# Patient Record
Sex: MALE | Race: WHITE | NOT HISPANIC OR LATINO | URBAN - METROPOLITAN AREA
[De-identification: names, ages, dates, MRNs, and addresses within clinical notes are randomized per-mention and may not be internally consistent; named-entity substitution may affect disease eponyms.]

---

## 2024-10-02 ENCOUNTER — APPOINTMENT (OUTPATIENT)
Dept: PRIMARY CARE | Facility: CLINIC | Age: 24
End: 2024-10-02
Payer: OTHER GOVERNMENT

## 2024-10-02 VITALS
TEMPERATURE: 98.3 F | OXYGEN SATURATION: 94 % | BODY MASS INDEX: 35.4 KG/M2 | SYSTOLIC BLOOD PRESSURE: 113 MMHG | HEART RATE: 76 BPM | HEIGHT: 69 IN | DIASTOLIC BLOOD PRESSURE: 68 MMHG | WEIGHT: 239 LBS

## 2024-10-02 DIAGNOSIS — L82.1 SEBORRHEIC KERATOSIS: Primary | ICD-10-CM

## 2024-10-02 DIAGNOSIS — F41.9 ANXIETY: ICD-10-CM

## 2024-10-02 DIAGNOSIS — G47.9 SLEEP DISORDER: ICD-10-CM

## 2024-10-02 PROCEDURE — 3008F BODY MASS INDEX DOCD: CPT | Performed by: STUDENT IN AN ORGANIZED HEALTH CARE EDUCATION/TRAINING PROGRAM

## 2024-10-02 PROCEDURE — 99204 OFFICE O/P NEW MOD 45 MIN: CPT | Performed by: STUDENT IN AN ORGANIZED HEALTH CARE EDUCATION/TRAINING PROGRAM

## 2024-10-02 RX ORDER — SERTRALINE HYDROCHLORIDE 100 MG/1
100 TABLET, FILM COATED ORAL
Qty: 30 TABLET | Refills: 11 | Status: SHIPPED | OUTPATIENT
Start: 2024-10-02 | End: 2025-09-27

## 2024-10-02 RX ORDER — KETOCONAZOLE 20 MG/ML
SHAMPOO, SUSPENSION TOPICAL 2 TIMES WEEKLY
Qty: 120 ML | Refills: 0 | Status: SHIPPED | OUTPATIENT
Start: 2024-10-03

## 2024-10-02 RX ORDER — SERTRALINE HYDROCHLORIDE 50 MG/1
1 TABLET, FILM COATED ORAL
COMMUNITY
Start: 2024-08-26 | End: 2024-10-02 | Stop reason: SDUPTHER

## 2024-10-02 ASSESSMENT — COLUMBIA-SUICIDE SEVERITY RATING SCALE - C-SSRS
1. IN THE PAST MONTH, HAVE YOU WISHED YOU WERE DEAD OR WISHED YOU COULD GO TO SLEEP AND NOT WAKE UP?: NO
2. HAVE YOU ACTUALLY HAD ANY THOUGHTS OF KILLING YOURSELF?: NO
6. HAVE YOU EVER DONE ANYTHING, STARTED TO DO ANYTHING, OR PREPARED TO DO ANYTHING TO END YOUR LIFE?: NO

## 2024-10-02 ASSESSMENT — PATIENT HEALTH QUESTIONNAIRE - PHQ9
5. POOR APPETITE OR OVEREATING: NEARLY EVERY DAY
7. TROUBLE CONCENTRATING ON THINGS, SUCH AS READING THE NEWSPAPER OR WATCHING TELEVISION: NEARLY EVERY DAY
9. THOUGHTS THAT YOU WOULD BE BETTER OFF DEAD, OR OF HURTING YOURSELF: NEARLY EVERY DAY
SUM OF ALL RESPONSES TO PHQ9 QUESTIONS 1 AND 2: 4
8. MOVING OR SPEAKING SO SLOWLY THAT OTHER PEOPLE COULD HAVE NOTICED. OR THE OPPOSITE, BEING SO FIGETY OR RESTLESS THAT YOU HAVE BEEN MOVING AROUND A LOT MORE THAN USUAL: NEARLY EVERY DAY
2. FEELING DOWN, DEPRESSED OR HOPELESS: MORE THAN HALF THE DAYS
SUM OF ALL RESPONSES TO PHQ QUESTIONS 1-9: 25
1. LITTLE INTEREST OR PLEASURE IN DOING THINGS: MORE THAN HALF THE DAYS
10. IF YOU CHECKED OFF ANY PROBLEMS, HOW DIFFICULT HAVE THESE PROBLEMS MADE IT FOR YOU TO DO YOUR WORK, TAKE CARE OF THINGS AT HOME, OR GET ALONG WITH OTHER PEOPLE: SOMEWHAT DIFFICULT
3. TROUBLE FALLING OR STAYING ASLEEP OR SLEEPING TOO MUCH: NEARLY EVERY DAY
4. FEELING TIRED OR HAVING LITTLE ENERGY: NEARLY EVERY DAY
6. FEELING BAD ABOUT YOURSELF - OR THAT YOU ARE A FAILURE OR HAVE LET YOURSELF OR YOUR FAMILY DOWN: NEARLY EVERY DAY

## 2024-10-02 ASSESSMENT — ENCOUNTER SYMPTOMS
LOSS OF SENSATION IN FEET: 0
NERVOUS/ANXIOUS: 1
DEPRESSION: 1
OCCASIONAL FEELINGS OF UNSTEADINESS: 0
DIZZINESS: 1

## 2024-10-02 ASSESSMENT — PAIN SCALES - GENERAL: PAINLEVEL: 0-NO PAIN

## 2024-10-02 NOTE — PROGRESS NOTES
"  Medical record number: [id number]    Subjective   Patient ID: Roddy Ortiz is a 24 y.o. male who presents for Establish Care (Npv looking for a doctor. /Pt wants a sleep test /Pt gets razor bumps/Ed and Pe ). Moved from [place] for     1. Sleep concerns  2 years of signficant issues  Things seem to have been escalating  Since age 18 having anxiety issues, social cues  Insomnia  Tries to nap, but works for [employer], so that is not always possible  Does not ever make up the deficit  No reported amy  Going to counseling online biweekly  Recent move from [place]  Regular exercise, athletic build  One previous potential concussion, age 16, football    A/  STOPBANG: S- T+ O- P- B+ for BMI 35, but rather muscular and regularly exercises, A- N incomplete, did not measure neck circumference, G+ male  STOPBANG score 3 (intermediate risk, but BMI questionably positive)  Denies SI, HI  Does not appear to be responding to internal stimuli  Denies amy  Endorses a few instances of hypnopompic hallucination of \"shadow person\" during episodes of sleep paralysis  BMI 35, athletic build  /68  HR 76 bpm  T 36.8 F    P/  [ ] referral to Sleep Medicine for mixture of symptoms, including insomnia, hypersomnia, narcolepsy, intermediate CHARLES risk  [ ] referral to [facility] psychology, specifically for sexual symptoms counseling  [ ] referral to sleep psychology  [ ] can consider pharmacotherapy for sleep if counseling does not yield improvement    2. Erectile dysfunction, premature ejaculation  Started on zoloft 25 mg 4 months ago  Increased to 50 mg daily in September  With either dose, did not see appreciable improvement in   Lots of anxiety, as above    A/  Reviewed [facility] results on patient's phone from 9/2024  Includes LH, FSH, T, E, TSH, all within normal limits    P/  [ ] counseled patient that we should exhaust all other options for ED before prescribing medications for this  [ ] increased Zoloft to " "100 mg daily         Social History:  - Lives with self  - Feels safe Yes  - Tobacco or vaping: quit vaping a month ago, smokes cigars biweekly   - Alcohol use: occassional  - Marijuana use: No  - Illicit drug use: No  -Occupation: [employer]  -Sexual activity: 1 female partner    Family History:  Mother: Type 2 DM  Father: Anxiety  Paternal grandmother: alzehimers  Maternal Grandfather: Stroke    Past Medical History:  Anxiety/depression     Past Surgical History:  [place] teeth     Review of Systems   Neurological:  Positive for dizziness.   Psychiatric/Behavioral:  The patient is nervous/anxious.        Objective   /68 (BP Location: Right arm, Patient Position: Sitting, BP Cuff Size: Adult)   Pulse 76   Temp 36.8 °C (98.3 °F) (Oral)   Ht 1.753 m (5' 9\")   Wt 108 kg (239 lb)   SpO2 94%   BMI 35.29 kg/m²     Physical Exam  Constitutional:       Appearance: Normal appearance.   HENT:      Head: Normocephalic.   Eyes:      Extraocular Movements: Extraocular movements intact.   Cardiovascular:      Rate and Rhythm: Normal rate and regular rhythm.      Heart sounds: Normal heart sounds.   Pulmonary:      Effort: Pulmonary effort is normal.      Breath sounds: Normal breath sounds.   Skin:     General: Skin is warm and dry.   Neurological:      Mental Status: He is alert and oriented to person, place, and time.   Psychiatric:         Mood and Affect: Mood normal.         Behavior: Behavior normal.         Assessment/Plan   Problem List Items Addressed This Visit    None  Visit Diagnoses         Codes    Seborrheic keratosis    -  Primary L82.1    Relevant Medications    ketoconazole (NIZOral) 2 % shampoo    Sleep disorder     G47.9    Relevant Medications    sertraline (Zoloft) 100 mg tablet    Other Relevant Orders    Referral to Adult Sleep Medicine    Referral to Sleep Psychology    Referral to Psychology    Anxiety     F41.9    Relevant Medications    sertraline (Zoloft) 100 mg tablet             " Hemorrhoids, had ear infection 2 weeks ago s/p antibiotics  Acid reflux   Erectile dysfunction and premature ejaculation for the past year started Zoloft without much help    OVERALL PLAN:  [ ] sleep study for hypersomnia/narcolepsy     Note: this documentation was entered into the electronic medical record using [employer] medical dictation software, and was not necessarily edited thereafter. Please excuse any errors of spelling or grammar.

## 2024-10-02 NOTE — LETTER
October 2, 2024     Patient: Roddy Ortiz   YOB: 2000   Date of Visit: 10/2/2024       To Whom It May Concern:    It is my medical opinion that Roddy Ortiz suffers from a medical condition of seborrheic keratosis of the face and neck. I have given him recommended treatment for this condition, which includes not shaving his face and neck for 6 months. He will be re-evaluated for treatment efficacy in that time as well.    If you have any questions or concerns, please don't hesitate to call. [phone].         Sincerely,        Bradley Mora MD    CC: No Recipients

## 2024-10-26 DIAGNOSIS — G47.9 SLEEP DISORDER: ICD-10-CM

## 2024-10-26 DIAGNOSIS — F41.9 ANXIETY: ICD-10-CM

## 2024-10-30 RX ORDER — SERTRALINE HYDROCHLORIDE 100 MG/1
100 TABLET, FILM COATED ORAL
Qty: 90 TABLET | Refills: 4 | Status: SHIPPED | OUTPATIENT
Start: 2024-10-30 | End: 2025-10-25

## 2024-12-04 ENCOUNTER — APPOINTMENT (OUTPATIENT)
Dept: PRIMARY CARE | Facility: CLINIC | Age: 24
End: 2024-12-04
Payer: OTHER GOVERNMENT

## 2024-12-04 VITALS
SYSTOLIC BLOOD PRESSURE: 116 MMHG | HEIGHT: 69 IN | OXYGEN SATURATION: 97 % | WEIGHT: 242 LBS | DIASTOLIC BLOOD PRESSURE: 64 MMHG | TEMPERATURE: 98.6 F | BODY MASS INDEX: 35.84 KG/M2 | HEART RATE: 69 BPM

## 2024-12-04 DIAGNOSIS — F41.9 ANXIETY: Primary | ICD-10-CM

## 2024-12-04 PROCEDURE — 99213 OFFICE O/P EST LOW 20 MIN: CPT | Performed by: STUDENT IN AN ORGANIZED HEALTH CARE EDUCATION/TRAINING PROGRAM

## 2024-12-04 PROCEDURE — 3008F BODY MASS INDEX DOCD: CPT | Performed by: STUDENT IN AN ORGANIZED HEALTH CARE EDUCATION/TRAINING PROGRAM

## 2024-12-04 RX ORDER — BUPROPION HYDROCHLORIDE 75 MG/1
75 TABLET ORAL 2 TIMES DAILY
Qty: 60 TABLET | Refills: 1 | Status: SHIPPED | OUTPATIENT
Start: 2024-12-04 | End: 2025-02-02

## 2024-12-04 ASSESSMENT — PATIENT HEALTH QUESTIONNAIRE - PHQ9
2. FEELING DOWN, DEPRESSED OR HOPELESS: MORE THAN HALF THE DAYS
10. IF YOU CHECKED OFF ANY PROBLEMS, HOW DIFFICULT HAVE THESE PROBLEMS MADE IT FOR YOU TO DO YOUR WORK, TAKE CARE OF THINGS AT HOME, OR GET ALONG WITH OTHER PEOPLE: SOMEWHAT DIFFICULT
1. LITTLE INTEREST OR PLEASURE IN DOING THINGS: MORE THAN HALF THE DAYS
5. POOR APPETITE OR OVEREATING: MORE THAN HALF THE DAYS
7. TROUBLE CONCENTRATING ON THINGS, SUCH AS READING THE NEWSPAPER OR WATCHING TELEVISION: MORE THAN HALF THE DAYS
4. FEELING TIRED OR HAVING LITTLE ENERGY: MORE THAN HALF THE DAYS
6. FEELING BAD ABOUT YOURSELF - OR THAT YOU ARE A FAILURE OR HAVE LET YOURSELF OR YOUR FAMILY DOWN: MORE THAN HALF THE DAYS
SUM OF ALL RESPONSES TO PHQ QUESTIONS 1-9: 16
3. TROUBLE FALLING OR STAYING ASLEEP OR SLEEPING TOO MUCH: MORE THAN HALF THE DAYS
9. THOUGHTS THAT YOU WOULD BE BETTER OFF DEAD, OR OF HURTING YOURSELF: NOT AT ALL
8. MOVING OR SPEAKING SO SLOWLY THAT OTHER PEOPLE COULD HAVE NOTICED. OR THE OPPOSITE, BEING SO FIGETY OR RESTLESS THAT YOU HAVE BEEN MOVING AROUND A LOT MORE THAN USUAL: MORE THAN HALF THE DAYS
SUM OF ALL RESPONSES TO PHQ9 QUESTIONS 1 AND 2: 4

## 2024-12-04 ASSESSMENT — ENCOUNTER SYMPTOMS
OCCASIONAL FEELINGS OF UNSTEADINESS: 0
DEPRESSION: 1
LOSS OF SENSATION IN FEET: 0

## 2024-12-04 ASSESSMENT — PAIN SCALES - GENERAL: PAINLEVEL_OUTOF10: 0-NO PAIN

## 2024-12-04 NOTE — PATIENT INSTRUCTIONS
For the next 4 days, take 25 mg (1/2 tablet) daily. Day 5: take nothing. Day 6, take 1/2 tablet. Day 7, take nothing. 2 days total washout, then begin the new medication.

## 2024-12-04 NOTE — PROGRESS NOTES
"  Subjective   Patient ID: Roddy Ortiz is a 24 y.o. male who presents for Follow-up.     1. Anxiety / depression  I had recommended increase to 100 mg zoloft  Down to sertraline 50 mg, after worsening premature ejaculation  Happening more often/frequently  Has occurred a time or two as a teen  Since starting sertraline in June of this year, PE has occurred with almost every instance of arousal    A/  Endorses anxiety, depressed mood  Still having insomnia, snoring - see previous note for further detail  BMI 35, muscular build  Denies SI/HI  Does not appear to be responding to internal stimuli  Hormonal component reviewed and unlikely  No known vasculopathy    P/  [ ] taper of sertraline planned  [ ] following taper: initiate wellbutrin 75 mg every day, then up to BID after 2 weeks, if side effect free       Review of Systems   All other systems reviewed and are negative.      Objective   /64 (BP Location: Right arm, Patient Position: Sitting, BP Cuff Size: Large adult)   Pulse 69   Temp 37 °C (98.6 °F) (Temporal)   Ht 1.753 m (5' 9\")   Wt 110 kg (242 lb)   SpO2 97%   BMI 35.74 kg/m²     Physical Exam  Vitals reviewed.   Constitutional:       General: He is not in acute distress.  HENT:      Head: Normocephalic and atraumatic.      Nose: Nose normal.      Mouth/Throat:      Mouth: Mucous membranes are moist.      Pharynx: Oropharynx is clear.   Eyes:      Extraocular Movements: Extraocular movements intact.      Conjunctiva/sclera: Conjunctivae normal.      Pupils: Pupils are equal, round, and reactive to light.   Cardiovascular:      Rate and Rhythm: Normal rate.      Pulses: Normal pulses.      Heart sounds: Normal heart sounds. No murmur heard.     No friction rub. No gallop.   Pulmonary:      Effort: Pulmonary effort is normal.      Breath sounds: Normal breath sounds.   Abdominal:      General: Abdomen is flat. Bowel sounds are normal.      Palpations: Abdomen is soft.   Genitourinary:     Penis: " Normal.       Testes: Normal.   Musculoskeletal:      Cervical back: Normal range of motion and neck supple.   Skin:     General: Skin is warm and dry.      Capillary Refill: Capillary refill takes less than 2 seconds.   Neurological:      General: No focal deficit present.      Mental Status: He is alert.   Psychiatric:         Mood and Affect: Mood normal.         Behavior: Behavior normal.         Assessment/Plan   Problem List Items Addressed This Visit    None  Visit Diagnoses         Codes    Anxiety    -  Primary F41.9    Relevant Medications    buPROPion (Wellbutrin) 75 mg tablet    Other Relevant Orders    Follow Up In Primary Care                 Note: This documentaion was entered into the electronic medical record using [employer] medical dictation software, and was not necessarily edited thereafter. Please excuse any errors of spelling or grammar.

## 2024-12-05 NOTE — PROGRESS NOTES
Patient: Roddy Ortiz  : 2000 AGE: 24 y.o. SEX:male   MRN: [id number]   Provider: DUC Guerrier     Location [facility]   Service Date: 2024     PCP: Bradley Mora MD   Referred by: Bradley Mora MD          [facility]  New Visit Note    Virtual or Telephone Consent  An interactive audio and video telecommunication system which permits real time communications between the patient (at the originating site) and provider (at the distant site) was utilized to provide this telehealth service.   Verbal consent was requested and obtained from Roddy Ortiz on this date, 24 for a telehealth visit.      HISTORY OF PRESENT ILLNESS     Roddy Ortiz is a 24 y.o. male with a h/o Obesity and Anxiety who presents to [facility].    24: NPV with concerns of EDS, was told he needs to get a sleep study. Difficulty falling asleep, multiple night time awakenings, sleep paralysis/Hypnogogic hallucinations, un refreshed sleep, and EDS. Moved from [place] for [employer]     SLEEP-WAKE SCHEDULE    Sleep Patterns: He sometimes has a bed partner. In terms of the patient's sleep/wake cycle, he generally gets into bed at approximately 10:30-11 PM.  his latency to sleep onset after lights out is 30min to hours. During the night, the patient generally awakens 4 times nightly. These awakenings are usually brief in duration. Final wake time on weekday mornings is around 8 AM. Naps daily after work around 3PM for 30-120min which is unrefreshing.     Compared to weekdays, the patient's sleep schedule is  different on the weekends. Bed time midnight-2AM. Wake time 10-11AM.     Breathing during sleep: nasal congestion, nocturnal reflux symptoms, nocturnal cough  Behaviors at night: No   Sleep paralysis: Yes; 2x/month   Hypnogogic or hypnopompic hallucinations: Yes; 2x/month. Hearing people talking  that are not there as falling asleep   Cataplexy: possible; weak knees with extreme laughter     RLS screen: Patient denies RLS symptoms.    Daytime Symptoms:  On awakening patient reports: wake unrefreshed, feels sleepy, and hard to get out of bed  Patient report some daytime symptoms including: DAYTIME SYMPTOMS: reports excessive daytime sleepiness sleep inertia difficulty with memory or concentration during the day feeling sleepy when driving    Sleep environment:  Preferred sleep position: stomach and side  Room is dark: Yes  Room is quiet: Yes  Room is cool: Yes  Bed comfort: good    SLEEP HABITS  Caffeine consumption: Yes, 1-3 cups/day  Alcohol consumption: Yes, rarely (occasional)  Smoking: Yes, occasional cigar   Marijuana: No  Sleep aids: denies     WEIGHT: gained 10lbs in 4 months      ESS: 19  AUBREE: 16    REVIEW OF SYSTEMS     All other systems have been reviewed and are negative.    ALLERGIES     No Known Allergies    MEDICATIONS     Current Outpatient Medications   Medication Sig Dispense Refill    buPROPion (Wellbutrin) 75 mg tablet Take 1 tablet (75 mg) by mouth 2 times a day. 60 tablet 1    ketoconazole (NIZOral) 2 % shampoo Apply topically 2 times a week. Shampoo daily, leave on for 5-10 minutes, then rinse. 120 mL 0    sertraline (Zoloft) 100 mg tablet TAKE 1 TABLET (100 MG) BY MOUTH EARLY IN THE MORNING.. (Patient not taking: Reported on 12/12/2024) 90 tablet 4     No current facility-administered medications for this visit.       PAST HISTORIES     PERTINENT PAST MEDICAL HISTORY: See HPI    PERTINENT PAST SURGICAL HISTORY for Sleep Medicine:  non-contributory    PERTINENT FAMILY HISTORY for Sleep Medicine:  loud snoring- father    PERTINENT SOCIAL HISTORY:  He  reports that he has been smoking cigars. He has never used smokeless tobacco. He reports current alcohol use. He reports that he does not use drugs. He currently lives alone. In [employer] 8:30AM-2:30PM.     Active Problems, Allergy List,  "Medication List, and PMH/PSH/FH/Social Hx have been reviewed and reconciled in chart. No significant changes unless documented in the pertinent chart section. Updates made when necessary.     PHYSICAL EXAM     VITAL SIGNS: There were no vitals taken for this visit.    CURRENT WEIGHT: There were no vitals filed for this visit.     PREVIOUS WEIGHTS:  Wt Readings from Last 3 Encounters:   12/04/24 110 kg (242 lb)   10/02/24 108 kg (239 lb)     Limited telehealth examination  PHYSICAL EXAMINATION  General appearance: awake alert in NAD  Affect: normal  HEENT: Nasal congestion absent  Lungs: no cough.  Neuro: normal speech      RESULTS/DATA     No results found for: \"IRON\", \"TRANSFERRIN\", \"IRONSAT\", \"TIBC\", \"FERRITIN\"    No results found for: \"CO2\"    ASSESSMENT/PLAN     Mr. Ortiz is a 24 y.o. male and He was referred to the [facility] for evaluation of possible sleep disordered breathing and Hypersomnia    Problem List, Orders, Assessment, Recommendations:    #Sleep disordered breathing/suspected CHARLES  - Due to high pre-test probability without significant medical comorbidity or possible concomitant sleep disorder, I recommend home sleep apnea testing to evaluate for CHARLES  - Follow up after sleep study to review results and determine plan of care (if normal would proceed with PSG/MSLT)  -Diet, exercise, and weight loss were emphasized today in clinic, as were non-supine sleep, avoiding alcohol in the late evening, and driving or operating heavy machinery when sleepy.     #EDS  - gives some symptoms of narcolepsy but also reports sleep onset insomnia      #Obesity  BMI Readings from Last 1 Encounters:   12/04/24 35.74 kg/m²     - Encouraged healthy weight loss via diet and exercise  - Weight loss can help in the long term treatment of CHARLES.  - Defer management to PCP     All of patient's questions were answered. He verbalizes understanding and agreement with my assessment and " plan.    Disposition    Follow up after sleep study

## 2024-12-09 ENCOUNTER — PATIENT MESSAGE (OUTPATIENT)
Facility: CLINIC | Age: 24
End: 2024-12-09
Payer: OTHER GOVERNMENT

## 2024-12-12 ENCOUNTER — APPOINTMENT (OUTPATIENT)
Facility: CLINIC | Age: 24
End: 2024-12-12
Payer: OTHER GOVERNMENT

## 2024-12-12 DIAGNOSIS — G47.30 SLEEP DISORDER BREATHING: Primary | ICD-10-CM

## 2024-12-12 DIAGNOSIS — G47.9 SLEEP DISORDER: ICD-10-CM

## 2024-12-12 DIAGNOSIS — G47.19 EXCESSIVE DAYTIME SLEEPINESS: ICD-10-CM

## 2024-12-12 DIAGNOSIS — G47.8 SLEEP PARALYSIS: ICD-10-CM

## 2024-12-12 DIAGNOSIS — E66.9 OBESITY (BMI 30-39.9): ICD-10-CM

## 2024-12-12 PROCEDURE — 99204 OFFICE O/P NEW MOD 45 MIN: CPT | Performed by: NURSE PRACTITIONER

## 2024-12-12 ASSESSMENT — SLEEP AND FATIGUE QUESTIONNAIRES
SATISFACTION_WITH_CURRENT_SLEEP_PATTERN: VERY DISSATISFIED
HOW LIKELY ARE YOU TO NOD OFF OR FALL ASLEEP WHILE SITTING AND READING: MODERATE CHANCE OF DOZING
HOW LIKELY ARE YOU TO NOD OFF OR FALL ASLEEP WHILE WATCHING TV: MODERATE CHANCE OF DOZING
HOW LIKELY ARE YOU TO NOD OFF OR FALL ASLEEP WHILE SITTING AND TALKING TO SOMEONE: WOULD NEVER DOZE
HOW LIKELY ARE YOU TO NOD OFF OR FALL ASLEEP WHILE LYING DOWN TO REST IN THE AFTERNOON WHEN CIRCUMSTANCES PERMIT: HIGH CHANCE OF DOZING
HOW LIKELY ARE YOU TO NOD OFF OR FALL ASLEEP WHILE SITTING QUIETLY AFTER LUNCH WITHOUT ALCOHOL: HIGH CHANCE OF DOZING
HOW LIKELY ARE YOU TO NOD OFF OR FALL ASLEEP IN A CAR, WHILE STOPPED FOR A FEW MINUTES IN TRAFFIC: HIGH CHANCE OF DOZING
SLEEP_PROBLEM_NOTICEABLE_TO_OTHERS: SOMEWHAT
SITING INACTIVE IN A PUBLIC PLACE LIKE A CLASS ROOM OR A MOVIE THEATER: HIGH CHANCE OF DOZING
DIFFICULTY_FALLING_ASLEEP: MODERATE
HOW LIKELY ARE YOU TO NOD OFF OR FALL ASLEEP WHEN YOU ARE A PASSENGER IN A CAR FOR AN HOUR WITHOUT A BREAK: HIGH CHANCE OF DOZING
WORRIED_DISTRESSED_DUE_TO_SLEEP: SOMEWHAT
DIFFICULTY_STAYING_ASLEEP: MODERATE
SLEEP_PROBLEM_INTERFERES_DAILY_ACTIVITIES: VERY MUCH NOTICEABLE
ESS-CHAD TOTAL SCORE: 19

## 2024-12-12 ASSESSMENT — COLUMBIA-SUICIDE SEVERITY RATING SCALE - C-SSRS
6. HAVE YOU EVER DONE ANYTHING, STARTED TO DO ANYTHING, OR PREPARED TO DO ANYTHING TO END YOUR LIFE?: NO
1. IN THE PAST MONTH, HAVE YOU WISHED YOU WERE DEAD OR WISHED YOU COULD GO TO SLEEP AND NOT WAKE UP?: NO
2. HAVE YOU ACTUALLY HAD ANY THOUGHTS OF KILLING YOURSELF?: NO

## 2024-12-12 ASSESSMENT — ENCOUNTER SYMPTOMS
LOSS OF SENSATION IN FEET: 0
OCCASIONAL FEELINGS OF UNSTEADINESS: 0
DEPRESSION: 0

## 2024-12-12 ASSESSMENT — PATIENT HEALTH QUESTIONNAIRE - PHQ9
1. LITTLE INTEREST OR PLEASURE IN DOING THINGS: NOT AT ALL
SUM OF ALL RESPONSES TO PHQ9 QUESTIONS 1 AND 2: 0
2. FEELING DOWN, DEPRESSED OR HOPELESS: NOT AT ALL

## 2024-12-12 NOTE — PATIENT INSTRUCTIONS
[facility]   [place]  [facility]  [address] [place] [address]  [phone]       NAME: Roddy Ortiz   DATE: 12/12/24    Your Sleep Provider Today: DUC Guerrier  Your Primary Care Physician: Bradley Mora MD       DIAGNOSIS:   1. Sleep disorder breathing        2. Sleep disorder  Referral to Adult Sleep Medicine          Thank you for coming to the Sleep Medicine Clinic today! Your sleep medicine provider today was: DUC Guerrier Below is a summary of your treatment plan, other important information, and our contact numbers:      TREATMENT PLAN     - Follow-up in 1.5 months.  - If not already done, sign up for 'My Chart' and send prescription requests or messages through this    Scheduling a Sleep Study    Your provider ordered a sleep apnea test today. It will usually take 2 - 3 business days for Insurance to approve the order.     Once you test is approved it will be sent to the ordering sleep lab. When the sleep lab is notified of the new order, they will reach out to you to get you scheduled for an in-lab sleep study or to get you scheduled for a pick-up date for your Home Sleep Study Kit (depending on which type of study your provider recommended).    They usually will reach out to you about 1 week after your test has been ordered. Please contact the office at [phone] if you have not heard back from them in 2 weeks after you have seen your provider. If your sleep study was ordered through  [employer] (mail away kit) you can call them at [phone] if you do not hear from them within 2 weeks.     Sleep Testing for sleep apnea: The best and ideal way to check out if you have sleep apnea is to do an overnight sleep study in the sleep laboratory. Alternatively, a home sleep apnea test can also be done depending on your insurance and risk factors.     If you are having a home sleep apnea test, kindly allot 1 hour  during pickup of the testing kit as you will have to complete paperwork and listen to the sleep technician for in-person on-the-spot demonstration and instructions on how to hook up the testing kit at home. Do the test for 1 day and start off with sleeping on your back. If you sleep on your side in the middle of night or you have always been a side or stomach-sleeper, it is ok as long as you have some time on your back and off-back.     If you are having an overnight in-lab sleep study, please make sure to bring toiletries, a comfy pillow, additional warm blankets, and any nighttime medications (include as-needed inhaler, pain pill, etc) that you may regularly take. Also, be sure to eat dinner before you arrive as we generally do not provide meals inside the sleep testing center. Lastly, in order to fall asleep faster in the sleep testing center, we advise patients to wake up 2 hours earlier on the morning of scheduled testing and avoid napping 2 days prior testing. Sometimes, your sleep provider may prescribe a sleep aid to be taken at lights out in the sleep testing center. If you are taking a sleep aid, consider having somebody pick you up after the sleep testing.    Overnight sleep studies may be scheduled on a weekday or weekend. We also perform daytime testing for shift workers on a case-by-case basis.    Once you have booked an appointment to do the sleep study, please contact my office for follow-up visit to discuss results.    IMPORTANT INFORMATION     Call 911 for medical emergencies.  Our offices are generally open from Monday-Friday, 9 am - 5 pm.  If you need to get in touch with me, you may either call me/my team (number is below) or you can use [employer].  If a referral for a test, for CPAP, or for another specialist was made, and you have not heard about scheduling this within a week, please call scheduling at [phone] ([phone]).  If you are unable to make your appointment for clinic or an overnight  "study, kindly call the office at least 48 hours in advance to cancel and reschedule.  If you are on CPAP, please bring your device's card or the device to each clinic appointment.   There are no supporting services by either the sleep doctors or their staff on weekends and Holidays, or after 5 PM on weekdays.     PRESCRIPTIONS     We require 7 days advanced notice for prescription refills. If we do not receive the request in this time, we cannot guarantee that your medication will be refilled in time.      IMPORTANT PHONE NUMBERS     Behavioral Sleep Medicine: [phone]  [employer] (DME): [phone]  [employer] (DME): [phone]  [facility] (DME): [phone]    CONTACTING YOUR SLEEP MEDICINE PROVIDER AND SLEEP TEAM      For issues with your machine or mask interface, please call your DME provider first. #waywire stands for durable medical company. #[employer] is the company who provides you the machine and/or PAP supplies / accessories.   To schedule, cancel, or reschedule SLEEP STUDY APPOINTMENTS, please call the Main Phone Line at [phone] - option 3.   To schedule, cancel, or reschedule CLINIC APPOINTMENTS, you can do it in \"[employer]\", call [phone] for [facility] office , [phone] for [facility] office to speak with my on site staff, or call the Main Phone Line at [phone] - option 2  For CLINICAL QUESTIONS or MEDICATION REFILLS, please call direct line for Adult Sleep Nurses at [phone].   Lastly, you can also send a message directly to your provider through \"My Chart\", which is the email service through your  Records Account: [email or web address]     Adult Sleep Nurses (Arlette Garland, CHARLI and Stephanie Guzmán RN):  For clinical questions and refilling prescriptions: [phone]  Email sleep diaries and other documents at: [email or web address]    Office locations for Ofelia Hernandez NP:    [address] " [address]  [place], [place] [address] [phone]    [address]  [address]  [place], [place] [address] [phone]    [address]  [address]  [place], [place] [address] [phone]    OUR SLEEP TESTING LOCATIONS     Our team will contact you to schedule your sleep study, however, you can contact us as follow:  Main Phone Line (scheduling only): [phone]), option 3    Sleep Testing Locations:   [place] (18 years and older): [address] [facility], 2nd floor   [place] (18 years and older): [address] [facility]; 4th floor  After hours line: [phone]  [facility] (18 years and older) at [place]: [address] [place] [address]  After hours line: [phone]   [facility] (Main campus: All ages): [facility], 6th floor. After hours line: [phone]   [place] (5 years and older; younger considered on case-by-case basis): [address] [facility]; [address], [address]. Scheduling  After hours line: [phone]       Here at [facility], we wish you a restful sleep!    Your sleep medicine provider for this visit was: GUTIERREZ Guerrier-CNP

## 2024-12-19 DIAGNOSIS — N52.9 ERECTILE DYSFUNCTION, UNSPECIFIED ERECTILE DYSFUNCTION TYPE: Primary | ICD-10-CM

## 2024-12-19 RX ORDER — TADALAFIL 10 MG/1
10 TABLET ORAL DAILY PRN
Qty: 12 TABLET | Refills: 3 | Status: SHIPPED | OUTPATIENT
Start: 2024-12-19 | End: 2025-12-19

## 2024-12-31 DIAGNOSIS — F41.9 ANXIETY: ICD-10-CM

## 2025-01-02 ENCOUNTER — APPOINTMENT (OUTPATIENT)
Facility: HOSPITAL | Age: 25
End: 2025-01-02
Payer: OTHER GOVERNMENT

## 2025-01-02 RX ORDER — BUPROPION HYDROCHLORIDE 75 MG/1
75 TABLET ORAL 2 TIMES DAILY
Qty: 180 TABLET | Refills: 3 | Status: SHIPPED | OUTPATIENT
Start: 2025-01-02

## 2025-01-06 DIAGNOSIS — F32.A DEPRESSION, UNSPECIFIED DEPRESSION TYPE: Primary | ICD-10-CM

## 2025-01-06 NOTE — PROGRESS NOTES
"Following up on urgent [employer] message, quoted here:    \"I have recently been experiencing suicidal thoughts and i think it might be because of this medication. They are passive thoughts but they are scaring me and i really don’t want to feel like this. I’m not going to hurt myself and my girlfriend is coming over tonight to  comfort me and help me through it. I’m sorry for putting this on you.     \"    Spoke with patient who is working from home. Girlfriend came over last night to comfort him. Says that their conversation was productive. He did not take the 75 mg wellbutrin this AM, last dose was yesterday afternoon.     Describes the suicidal ideation as passive and not wanting to be here any more. Holidays were stressful going home and family being not entirely supportive. Discussed again that his emotions were often dismissed as a child. Family drinks excessively and ruins social engagements.    PROTECTIVE FACTORS:  No active plan  Actively seeing counseling    CONCERNING FACTORS:  White male   service  Has firearms at home - handguns stored with ammunition separately, but shotgun stored with ammunition in it    Has virtual follow up with his counselor today. Does not have the best relationship with him, thinks he speaks a lot about himself and too many sports analogies. Going to have visit. Also requesting a psychiatry referral, which I am happy to place.    PLAN:  [ ] Discussed extensively his current situation and mental state. Denies current suicidal ideation, intent, or plan. Wants to continue counseling, but interested in another counselor in near future.  [ ] Urgent referral placed to Psychiatry.  [ ] Patient to stop taking wellbutrin at this time.  [ ] Safety plan discussed. Patient will call 9-1-1 if in crisis and afraid for self-harm. Non-life-threatening, urgent options include [facility] (number given), or else contacting me.  [ ] I will call in next 48 hours to check in.  "

## 2025-01-15 ENCOUNTER — OFFICE VISIT (OUTPATIENT)
Facility: HOSPITAL | Age: 25
End: 2025-01-15
Payer: OTHER GOVERNMENT

## 2025-01-15 VITALS
BODY MASS INDEX: 36.01 KG/M2 | SYSTOLIC BLOOD PRESSURE: 132 MMHG | HEIGHT: 69 IN | DIASTOLIC BLOOD PRESSURE: 77 MMHG | OXYGEN SATURATION: 97 % | TEMPERATURE: 98.9 F | HEART RATE: 77 BPM | WEIGHT: 243.1 LBS

## 2025-01-15 DIAGNOSIS — F41.9 ANXIETY: Primary | ICD-10-CM

## 2025-01-15 PROCEDURE — 99213 OFFICE O/P EST LOW 20 MIN: CPT | Performed by: STUDENT IN AN ORGANIZED HEALTH CARE EDUCATION/TRAINING PROGRAM

## 2025-01-15 PROCEDURE — 3008F BODY MASS INDEX DOCD: CPT | Performed by: STUDENT IN AN ORGANIZED HEALTH CARE EDUCATION/TRAINING PROGRAM

## 2025-01-15 ASSESSMENT — PAIN SCALES - GENERAL: PAINLEVEL_OUTOF10: 0-NO PAIN

## 2025-01-15 NOTE — PROGRESS NOTES
"  Subjective   Patient ID: Roddy Ortiz is a 24 y.o. male who presents for Follow-up.     Interval changes:  discussion following suicidal ideation    Describes some emotional blunting  Also some misanthropy  Some uninhibited conversation, especilly with girlfriend  Some binging on food, treva at night  Alternating with appetite  Not taking medicaitons, deferring at this time    A/  Denies SI/HI/SA  Not having passive SI since we last spoke last Monday  Does not appear to be responding to internal stimuli    P/  [ ] deferring further pharmacotherapy of anx/dep to Psychiatry, given poor reactions to initial agents               Review of Systems   All other systems reviewed and are negative.      Objective   /77 (BP Location: Right arm, Patient Position: Sitting)   Pulse 77   Temp 37.2 °C (98.9 °F) (Temporal)   Ht 1.753 m (5' 9\")   Wt 110 kg (243 lb 1.6 oz)   SpO2 97%   BMI 35.90 kg/m²     Physical Exam  Vitals reviewed.   Constitutional:       General: He is not in acute distress.  HENT:      Head: Normocephalic and atraumatic.      Nose: Nose normal.      Mouth/Throat:      Mouth: Mucous membranes are moist.      Pharynx: Oropharynx is clear.   Eyes:      Extraocular Movements: Extraocular movements intact.      Conjunctiva/sclera: Conjunctivae normal.      Pupils: Pupils are equal, round, and reactive to light.   Cardiovascular:      Rate and Rhythm: Normal rate.      Pulses: Normal pulses.      Heart sounds: Normal heart sounds. No murmur heard.     No friction rub. No gallop.   Pulmonary:      Effort: Pulmonary effort is normal.      Breath sounds: Normal breath sounds.   Abdominal:      General: Abdomen is flat. Bowel sounds are normal.      Palpations: Abdomen is soft.   Musculoskeletal:      Cervical back: Normal range of motion and neck supple.   Skin:     General: Skin is warm and dry.      Capillary Refill: Capillary refill takes less than 2 seconds.   Neurological:      General: No focal " deficit present.      Mental Status: He is alert.   Psychiatric:         Mood and Affect: Mood normal.         Behavior: Behavior normal.         Assessment/Plan   Problem List Items Addressed This Visit             ICD-10-CM    Anxiety - Primary F41.9            Note: This documentaion was entered into the electronic medical record using [employer] medical dictation software, and was not necessarily edited thereafter. Please excuse any errors of spelling or grammar.

## 2025-01-20 PROBLEM — F41.9 ANXIETY: Status: ACTIVE | Noted: 2025-01-20

## 2025-02-05 ENCOUNTER — APPOINTMENT (OUTPATIENT)
Dept: BEHAVIORAL HEALTH | Facility: CLINIC | Age: 25
End: 2025-02-05
Payer: OTHER GOVERNMENT

## 2025-02-05 DIAGNOSIS — F52.4 PREMATURE EJACULATION: ICD-10-CM

## 2025-02-05 DIAGNOSIS — F32.A DEPRESSION, UNSPECIFIED DEPRESSION TYPE: Primary | ICD-10-CM

## 2025-02-05 DIAGNOSIS — F41.9 ANXIETY: ICD-10-CM

## 2025-02-05 PROCEDURE — 90792 PSYCH DIAG EVAL W/MED SRVCS: CPT | Performed by: STUDENT IN AN ORGANIZED HEALTH CARE EDUCATION/TRAINING PROGRAM

## 2025-02-05 RX ORDER — BUSPIRONE HYDROCHLORIDE 10 MG/1
10 TABLET ORAL 2 TIMES DAILY
Qty: 60 TABLET | Refills: 11 | Status: SHIPPED | OUTPATIENT
Start: 2025-02-05 | End: 2026-02-05
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
## 2025-02-13 DIAGNOSIS — G47.19 EXCESSIVE DAYTIME SLEEPINESS: ICD-10-CM

## 2025-02-13 DIAGNOSIS — G47.30 SLEEP DISORDER BREATHING: Primary | ICD-10-CM

## 2025-02-14 ENCOUNTER — APPOINTMENT (OUTPATIENT)
Facility: CLINIC | Age: 25
End: 2025-02-14
Payer: OTHER GOVERNMENT

## 2025-02-27 ENCOUNTER — OFFICE VISIT (OUTPATIENT)
Facility: HOSPITAL | Age: 25
End: 2025-02-27
Payer: OTHER GOVERNMENT

## 2025-02-27 VITALS
HEIGHT: 69 IN | SYSTOLIC BLOOD PRESSURE: 136 MMHG | BODY MASS INDEX: 35.55 KG/M2 | WEIGHT: 240 LBS | OXYGEN SATURATION: 98 % | HEART RATE: 72 BPM | DIASTOLIC BLOOD PRESSURE: 84 MMHG

## 2025-02-27 DIAGNOSIS — R19.5 DARK STOOLS: Primary | ICD-10-CM

## 2025-02-27 DIAGNOSIS — G47.00 INSOMNIA, UNSPECIFIED TYPE: ICD-10-CM

## 2025-02-27 DIAGNOSIS — K21.9 GASTROESOPHAGEAL REFLUX DISEASE, UNSPECIFIED WHETHER ESOPHAGITIS PRESENT: ICD-10-CM

## 2025-02-27 PROCEDURE — 99213 OFFICE O/P EST LOW 20 MIN: CPT | Performed by: STUDENT IN AN ORGANIZED HEALTH CARE EDUCATION/TRAINING PROGRAM

## 2025-02-27 PROCEDURE — 3008F BODY MASS INDEX DOCD: CPT | Performed by: STUDENT IN AN ORGANIZED HEALTH CARE EDUCATION/TRAINING PROGRAM

## 2025-02-27 RX ORDER — HYDROXYZINE PAMOATE 25 MG/1
25 CAPSULE ORAL NIGHTLY
Qty: 30 CAPSULE | Refills: 3 | Status: SHIPPED | OUTPATIENT
Start: 2025-02-27 | End: 2025-06-27

## 2025-02-27 RX ORDER — OMEPRAZOLE 20 MG/1
20 TABLET, DELAYED RELEASE ORAL DAILY
COMMUNITY
Start: 2025-02-27 | End: 2026-02-27

## 2025-02-27 ASSESSMENT — PAIN SCALES - GENERAL: PAINLEVEL_OUTOF10: 0-NO PAIN

## 2025-02-27 NOTE — PROGRESS NOTES
"  Subjective   Patient ID: Roddy Ortiz is a 24 y.o. male who presents for Follow-up (Bowel issues).     1. 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      Objective   /84   Pulse 72   Ht 1.753 m (5' 9\")   Wt 109 kg (240 lb)   SpO2 98%   BMI 35.44 kg/m²     Physical Exam  Vitals reviewed.   Constitutional:       General: He is not in acute distress.  HENT:      Head: Normocephalic and atraumatic.      Nose: Nose normal.      Mouth/Throat:      Mouth: Mucous membranes are moist.      Pharynx: Oropharynx is clear.   Eyes:      Extraocular Movements: Extraocular movements intact.      Conjunctiva/sclera: Conjunctivae normal.      Pupils: Pupils are equal, round, and reactive to light.   Cardiovascular:      Rate and Rhythm: Normal rate.      Pulses: Normal pulses.      Heart sounds: Normal heart sounds. No murmur heard.     No friction rub. No gallop.   Pulmonary:      Effort: Pulmonary effort is normal.      Breath sounds: Normal breath sounds.   Abdominal:      General: Abdomen is flat. Bowel sounds are normal.      Palpations: Abdomen is soft.   Musculoskeletal:      Cervical back: Normal range of motion and neck supple.   Skin:     General: Skin is warm and dry.      Capillary Refill: Capillary refill takes less than 2 seconds.   Neurological:      General: No focal deficit present.      Mental Status: He is alert.   Psychiatric:         Mood and Affect: Mood normal.         Behavior: Behavior normal.         Assessment/Plan   Problem List Items Addressed This Visit    None  Visit Diagnoses "         Codes    Dark stools    -  Primary R19.5    Relevant Orders    CBC    Comprehensive metabolic panel    Referral to Gastroenterology    Gastroesophageal reflux disease, unspecified whether esophagitis present     K21.9    Relevant Medications    omeprazole OTC (PriLOSEC OTC) 20 mg EC tablet    Other Relevant Orders    Comprehensive metabolic panel    Referral to Gastroenterology    Lipid panel    Insomnia, unspecified type     G47.00    Relevant Medications    hydrOXYzine pamoate (Vistaril) 25 mg capsule                       OVERALL PLAN:  [ ] Trial of PPI  [ ] referral to GI for further eval of possible source of melena, as well as hemorrhoids  [ ] CBC, CMP, lipids  [ ] trial of vistaril 25 mg for insomnia  [ ] patient has Psychiatrist coordinating mental health care, set to see Psychologist 1st week of March    Note: This documentaion was entered into the electronic medical record using [employer] medical dictation software, and was not necessarily edited thereafter. Please excuse any errors of spelling or grammar.

## 2025-03-03 ENCOUNTER — PROCEDURE VISIT (OUTPATIENT)
Dept: SLEEP MEDICINE | Facility: HOSPITAL | Age: 25
End: 2025-03-03
Payer: OTHER GOVERNMENT

## 2025-03-03 DIAGNOSIS — G47.19 EXCESSIVE DAYTIME SLEEPINESS: ICD-10-CM

## 2025-03-03 DIAGNOSIS — G47.30 SLEEP DISORDER BREATHING: ICD-10-CM

## 2025-03-03 PROCEDURE — 95806 SLEEP STUDY UNATT&RESP EFFT: CPT | Performed by: INTERNAL MEDICINE

## 2025-03-05 ENCOUNTER — APPOINTMENT (OUTPATIENT)
Dept: PRIMARY CARE | Facility: CLINIC | Age: 25
End: 2025-03-05
Payer: OTHER GOVERNMENT

## 2025-03-11 ENCOUNTER — APPOINTMENT (OUTPATIENT)
Dept: UROLOGY | Facility: CLINIC | Age: 25
End: 2025-03-11
Payer: OTHER GOVERNMENT

## 2025-03-11 DIAGNOSIS — F32.A DEPRESSION, UNSPECIFIED DEPRESSION TYPE: ICD-10-CM

## 2025-03-11 DIAGNOSIS — F41.9 ANXIETY: ICD-10-CM

## 2025-03-11 DIAGNOSIS — F52.4 PREMATURE EJACULATION: ICD-10-CM

## 2025-03-11 PROCEDURE — 90791 PSYCH DIAGNOSTIC EVALUATION: CPT | Performed by: PSYCHOLOGIST

## 2025-03-12 ENCOUNTER — APPOINTMENT (OUTPATIENT)
Dept: BEHAVIORAL HEALTH | Facility: CLINIC | Age: 25
End: 2025-03-12
Payer: OTHER GOVERNMENT

## 2025-03-12 DIAGNOSIS — F32.A DEPRESSION, UNSPECIFIED DEPRESSION TYPE: Primary | ICD-10-CM

## 2025-03-12 DIAGNOSIS — F41.9 ANXIETY: ICD-10-CM

## 2025-03-12 PROCEDURE — 99213 OFFICE O/P EST LOW 20 MIN: CPT | Performed by: STUDENT IN AN ORGANIZED HEALTH CARE EDUCATION/TRAINING PROGRAM
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
## 2025-03-24 ENCOUNTER — TELEPHONE (OUTPATIENT)
Dept: SLEEP MEDICINE | Facility: HOSPITAL | Age: 25
End: 2025-03-24
Payer: OTHER GOVERNMENT

## 2025-03-25 DIAGNOSIS — R44.2 HYPNAGOGIC HALLUCINATIONS: ICD-10-CM

## 2025-03-25 DIAGNOSIS — G47.8 SLEEP PARALYSIS: ICD-10-CM

## 2025-03-25 DIAGNOSIS — G47.19 EXCESSIVE DAYTIME SLEEPINESS: Primary | ICD-10-CM

## 2025-03-30 PROBLEM — F52.4 PREMATURE EJACULATION: Status: ACTIVE | Noted: 2025-03-30

## 2025-03-30 NOTE — PROGRESS NOTES
"Outpatient Psychology Initial Appointment  Subjective   Roddy Ortiz, a 24 y.o. male, for initial evaluation visit. Participated in diagnostic interview and identification of goals for treatment.      Patient is referred by Hua Nance MD.      Reason:  He has been experiencing distress due to sexual difficulties.      Psychosocial History Roddy reports that he is currently seeking psychological services under the recommendation of his psychiatrist. He states that he has been having difficulties with erectile functioning difficulties and early ejaculation.  This has led to increasing frustration in his sexual life.  Roddy reports that he has been dating his girlfriend Angelina for the past 8 months.  Currently they have been going through a \"tough patch\". She has been his most consistent partner. He expressed that he has had some difficulties with prior partners.  In 2021 he started going on dating apps after breaking up with his high school girlfriend. He expressed that he feels he is \"not proud of it\" because he feels that sex should be shared with someone important, and feels that he pursued it out of the wrong reasons after his girlfriend broke it off. Roddy states that with his current relationship they have not seen each other as much as typical lately, which has decreased their frequency.  He typically is the one who initiates.  At times she will not follow through, goes to sleep, or \"shoots it down\".  He does not want to pressure, and often feels anxious about the fear of not performing. During masturbation he at times has some difficulties, but can learn to pace himself differently than when he is with his partner. When they do engage, he often lasts under a minute before climax.  He states that communication about sex has been limited, stating that she does not often feel comfortable discussing it outside of the moments. According to him, she also does not spend much time after sex to do things " like lay with him, which he feels he needs for the connection.     Roddy reports that he has a history of generalized anxiety since the age of 18 years of age, and started to seek help at the age of 21 years.  He talked with a counselor and states that working with a psychologist helped him to learn to thing differently about things.  He states that he has had performance anxiety throughout his life.     Roddy is in the  with the [employer] for four and a half years, starting duty here. He grew up in [place], and will be going to [place] soon.      Mental Status Evaluation:  Appearance:  age appropriate   Behavior:  normal   Speech:  normal pitch and normal volume   Mood:  normal   Affect:  normal   Thought Process:  normal   Thought Content:  normal   Sensorium:  person, place, time/date, situation, day of week, month of year, and year   Cognition:  grossly intact   Insight:  Intact, as evidenced by ability to express thoughts, feelings, and experiences. Insight into past experiences and impact on current function.          Assessment/Plan     Diagnosis:   Patient Active Problem List   Diagnosis    Sleep disorder breathing    Excessive daytime sleepiness    Sleep paralysis    Obesity (BMI 30-39.9)    Anxiety    Hypnagogic hallucinations    Premature ejaculation       Treatment Goals:  Specify outcomes written in observable, behavioral terms:   Anxiety: eliminating avoidance (specify sexual engagement), modifying schemata of threat/vulnerability/need for control (or other schemata -- specify sexual scripting), and reducing physical symptoms of anxiety  Improve sexual response and functioning; Improve sexual satisfaction.     Treatment Plan/Recommendations: Provided psychoeducation/psychosexual education regarding the overlay of mental health and sexual health as it pertains to patient's presenting concerns.    Teach Cognitive and Behavioral strategies for improved sexual response and  functioning.    Teach Cognitive and Behavioral strategies for mood/anxiety management.    Teach intimacy skills towards improved sexual satisfaction.       Review with patient: Treatment plan reviewed with the patient.    Jose Enrique Nguyen PsyD

## 2025-04-14 ENCOUNTER — DOCUMENTATION (OUTPATIENT)
Dept: BEHAVIORAL HEALTH | Facility: CLINIC | Age: 25
End: 2025-04-14
Payer: OTHER GOVERNMENT

## 2025-06-09 ENCOUNTER — DOCUMENTATION (OUTPATIENT)
Dept: BEHAVIORAL HEALTH | Facility: CLINIC | Age: 25
End: 2025-06-09

## 2025-06-09 ENCOUNTER — DOCUMENTATION (OUTPATIENT)
Dept: BEHAVIORAL HEALTH | Facility: CLINIC | Age: 25
End: 2025-06-09
Payer: OTHER GOVERNMENT
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
## 2025-06-09 NOTE — PROGRESS NOTES
Start Time: 4:00pm  End Time: 4:50pm   Date of Service:  6/2/25    Content of therapy:   We discussed strategies around managing his social media use, deployment plans, and his recent contact with his ex-girlfriend.     Mental Status  Mood: constricted   Affect:  Stable, midrange   Behavior:   Cooperative, conversant, engaged, and with good eye contact.   Suicidality & violence: None, denied     Therapeutic Interventions:  -Guided patient through identifying goals for upcoming course of therapy.     -Supported patient in addressing relationship issues in midst of change and conflict.     -Explored with patient intersection of mental and physical health with self-care routine.      PSYCHIATRIC RISK ASSESSMENT  Violence Risk Factors:  male, current psychiatric illness, access to weapons,  history or weapons training, and stress/destabilizers  Acute Risk of Harm to Others is Considered: Low  Suicide Risk Factors: male, ; /[place] native, history of trauma or abuse, and decreased self-esteem  Protective Factors: strong coping skills, fear of suicide or death, fear of social disapproval, sense of responsibility towards family, social support/connectedness, moral objections to suicide, motivation to avoid legal consequences, positive family relationships, hopefulness/future-orientation, marriage/partnership, employment, frustration tolerance, and life satisfaction  Acute Risk of Harm to Self is Considered: Low    Assessment:  Roddy Ortiz is a 24-year-old male with past psychiatric history of depression and anxiety I initially saw for medication management who decided against medication at this time and is now seeking therapy for emotional regulation, self-esteem, and relationship issues. There is no history of psychiatric hospitalization, suicidal behaviors, psychosis, amy, or substance abuse. I am seeing him for a limited course of therapy before he re-locates for work mid-June.      We did motivational interviewing to address the negative feelings he gets seeing his girlfriend on social media. He is committing to reduce contact with his ex-girlfriend at her request and thinks this is best for his mental health as well.     Diagnosis:  Adjustment Disorder NOS    Plan:  This plan was developed collaboratively with Roddy Ortiz. Discussed treatment progress and plans to continue therapy. He agreed to continue with treatment plan as noted below.      - Assist in understanding emotional, cognitive, and behavioral components of anxiety/depression/anger; develop skills to manage these symptoms  - Will learn to identify anxiety producing thoughts and self statements and develop and use appropriate counter statements  - Assist in identifying and verbalizing feelings to clarify them and increase interpersonal awareness as to causes; use insight gained to assist in resolving negative emotions in a more effective manner  - Develop a plan with patient to include activities that will increase their satisfaction, fulfill their values, and improve quality of life. Evaluate effectiveness of plan; Introduce goal setting and problem solving skills  - Assist in increasing awareness of link between emotions, thoughts and behaviors; identify irrational beliefs and cognitive distortions; learn to challenge irrational beliefs and cognitive distortions and replace them with more realistic self statements (CBT)  - Assist in developing an understanding of healthy life- style skills (i.e. nutrition, exercise, sleep, decreasing caffeine, etc.). Develop plan to increase healthy activities. Provide positive reinforcement for taking action steps toward a healthier life-style.    Case discussed with Dr. Workman

## 2025-06-09 NOTE — PROGRESS NOTES
Start Time: 4:00pm  End Time: 4:50pm   Date of Service:  5/12/25    Content of therapy:   We continue to discuss managing his feelings of self-esteem around the breakup and feelings of disappointment and regret. We discussed possible social networks to involve in supporting him as well as self-care activities like healthy eating and exercise.      Mental Status  Mood: constricted   Affect:  Stable, midrange   Behavior:   Cooperative, conversant, engaged, and with good eye contact.   Suicidality & violence: None, denied     Therapeutic Interventions:  -Guided patient through identifying goals for upcoming course of therapy.     -Supported patient in addressing relationship issues in midst of change and conflict.     -Explored with patient intersection of mental and physical health with self-care routine.      PSYCHIATRIC RISK ASSESSMENT  Violence Risk Factors:  male, current psychiatric illness, access to weapons,  history or weapons training, and stress/destabilizers  Acute Risk of Harm to Others is Considered: Low  Suicide Risk Factors: male, ; /[place] native, history of trauma or abuse, and decreased self-esteem  Protective Factors: strong coping skills, fear of suicide or death, fear of social disapproval, sense of responsibility towards family, social support/connectedness, moral objections to suicide, motivation to avoid legal consequences, positive family relationships, hopefulness/future-orientation, marriage/partnership, employment, frustration tolerance, and life satisfaction  Acute Risk of Harm to Self is Considered: Low    Assessment:  Roddy Ortiz is a 24-year-old male with past psychiatric history of depression and anxiety I initially saw for medication management who decided against medication at this time and is now seeking therapy for emotional regulation, self-esteem, and relationship issues. There is no history of psychiatric hospitalization, suicidal behaviors,  psychosis, amy, or substance abuse. I am seeing him for a limited course of therapy before he re-locates for work at the end of June.     I encouraged him to integrate feelings from before and after the breakup and we focused on healthy self-care and coping strategies for his related distress.     Diagnosis:  Adjustment Disorder NOS    Plan:  This plan was developed collaboratively with Roddy Ortiz. Discussed treatment progress and plans to continue therapy. He agreed to continue with treatment plan as noted below.      - Assist in understanding emotional, cognitive, and behavioral components of anxiety/depression/anger; develop skills to manage these symptoms  - Will learn to identify anxiety producing thoughts and self statements and develop and use appropriate counter statements  - Assist in identifying and verbalizing feelings to clarify them and increase interpersonal awareness as to causes; use insight gained to assist in resolving negative emotions in a more effective manner  - Develop a plan with patient to include activities that will increase their satisfaction, fulfill their values, and improve quality of life. Evaluate effectiveness of plan; Introduce goal setting and problem solving skills  - Assist in increasing awareness of link between emotions, thoughts and behaviors; identify irrational beliefs and cognitive distortions; learn to challenge irrational beliefs and cognitive distortions and replace them with more realistic self statements (CBT)  - Assist in developing an understanding of healthy life- style skills (i.e. nutrition, exercise, sleep, decreasing caffeine, etc.). Develop plan to increase healthy activities. Provide positive reinforcement for taking action steps toward a healthier life-style.    Case discussed with Dr. Workman

## 2025-06-09 NOTE — PSYCHOTHERAPY
Start Time: 4:00pm  End Time: 4:50pm   Date of Service:  3/31/25    Content of therapy:   Pt reported struggles with emotional regulation in context of difficulties in his romantic relationship. We also discussed coming plans to relocate for work.     Mental Status  Mood: constricted   Affect:  Stable, midrange   Behavior:   Cooperative, conversant, engaged, and with good eye contact.   Suicidality: None, denied     Therapeutic Interventions:  -Guided patient through identifying goals for upcoming course of therapy.     -Supported patient in addressing relationship issues in midst of change and conflict.     -Explored with patient intersection of mental and physical health with self-care routine.      PSYCHIATRIC RISK ASSESSMENT  Violence Risk Factors:  male, current psychiatric illness, access to weapons,  history or weapons training, and stress/destabilizers  Acute Risk of Harm to Others is Considered: Low  Suicide Risk Factors: male, ; /[place] native, history of trauma or abuse, and decreased self-esteem  Protective Factors: strong coping skills, fear of suicide or death, fear of social disapproval, sense of responsibility towards family, social support/connectedness, moral objections to suicide, motivation to avoid legal consequences, positive family relationships, hopefulness/future-orientation, marriage/partnership, employment, frustration tolerance, and life satisfaction  Acute Risk of Harm to Self is Considered: Low    Assessment:  Roddy Ortiz is a 24-year-old male with past psychiatric history of depression and anxiety I initially saw for medication management who decided against medication at this time and is now seeking therapy for emotional regulation, self-esteem, and relationship issues. There is no history of psychiatric hospitalization, suicidal behaviors, psychosis, amy, or substance abuse. I am seeing him for a limited course of therapy before he re-locates  for work at the end of June.     Diagnosis:  Adjustment Disorder NOS    Plan:  This plan was developed collaboratively with Roddy Ortiz. Discussed treatment progress and plans to continue therapy. He agreed to continue with treatment plan as noted below.      - Assist in understanding emotional, cognitive, and behavioral components of anxiety/depression/anger; develop skills to manage these symptoms  - Will learn to identify anxiety producing thoughts and self statements and develop and use appropriate counter statements  - Assist in identifying and verbalizing feelings to clarify them and increase interpersonal awareness as to causes; use insight gained to assist in resolving negative emotions in a more effective manner  - Develop a plan with patient to include activities that will increase their satisfaction, fulfill their values, and improve quality of life. Evaluate effectiveness of plan; Introduce goal setting and problem solving skills  - Assist in increasing awareness of link between emotions, thoughts and behaviors; identify irrational beliefs and cognitive distortions; learn to challenge irrational beliefs and cognitive distortions and replace them with more realistic self statements (CBT)  - Assist in developing an understanding of healthy life- style skills (i.e. nutrition, exercise, sleep, decreasing caffeine, etc.). Develop plan to increase healthy activities. Provide positive reinforcement for taking action steps toward a healthier life-style.    Case discussed with Dr. Workman

## 2025-06-09 NOTE — PROGRESS NOTES
Start Time: 4:00pm  End Time: 4:50pm   Date of Service:  4/9/25    Content of therapy:   We discussed recent conflict with his girlfriend that lead to him ending their relationship and planned self-care while he deals with this adjustment.     Mental Status  Mood: constricted   Affect:  Stable, midrange   Behavior:   Cooperative, conversant, engaged, and with good eye contact.   Suicidality & violence: None, denied     Therapeutic Interventions:  -Guided patient through identifying goals for upcoming course of therapy.     -Supported patient in addressing relationship issues in midst of change and conflict.     -Explored with patient intersection of mental and physical health with self-care routine.      PSYCHIATRIC RISK ASSESSMENT  Violence Risk Factors:  male, current psychiatric illness, access to weapons,  history or weapons training, and stress/destabilizers  Acute Risk of Harm to Others is Considered: Low  Suicide Risk Factors: male, ; /[place] native, history of trauma or abuse, and decreased self-esteem  Protective Factors: strong coping skills, fear of suicide or death, fear of social disapproval, sense of responsibility towards family, social support/connectedness, moral objections to suicide, motivation to avoid legal consequences, positive family relationships, hopefulness/future-orientation, marriage/partnership, employment, frustration tolerance, and life satisfaction  Acute Risk of Harm to Self is Considered: Low    Assessment:  Roddy Ortiz is a 24-year-old male with past psychiatric history of depression and anxiety I initially saw for medication management who decided against medication at this time and is now seeking therapy for emotional regulation, self-esteem, and relationship issues. There is no history of psychiatric hospitalization, suicidal behaviors, psychosis, amy, or substance abuse. I am seeing him for a limited course of therapy before he re-locates  for work at the end of June.     Diagnosis:  Adjustment Disorder NOS    Plan:  This plan was developed collaboratively with Roddy Ortiz. Discussed treatment progress and plans to continue therapy. He agreed to continue with treatment plan as noted below.      - Assist in understanding emotional, cognitive, and behavioral components of anxiety/depression/anger; develop skills to manage these symptoms  - Will learn to identify anxiety producing thoughts and self statements and develop and use appropriate counter statements  - Assist in identifying and verbalizing feelings to clarify them and increase interpersonal awareness as to causes; use insight gained to assist in resolving negative emotions in a more effective manner  - Develop a plan with patient to include activities that will increase their satisfaction, fulfill their values, and improve quality of life. Evaluate effectiveness of plan; Introduce goal setting and problem solving skills  - Assist in increasing awareness of link between emotions, thoughts and behaviors; identify irrational beliefs and cognitive distortions; learn to challenge irrational beliefs and cognitive distortions and replace them with more realistic self statements (CBT)  - Assist in developing an understanding of healthy life- style skills (i.e. nutrition, exercise, sleep, decreasing caffeine, etc.). Develop plan to increase healthy activities. Provide positive reinforcement for taking action steps toward a healthier life-style.    Case discussed with Dr. Workman

## 2025-06-09 NOTE — PROGRESS NOTES
Start Time: 4:00pm  End Time: 4:50pm   Date of Service:  5/27/25    Content of therapy:   We discussed recent visit from his mother and aspects of his childhood as well as continued contact with his ex-girlfriend and social media use.     Mental Status  Mood: constricted   Affect:  Stable, midrange   Behavior:   Cooperative, conversant, engaged, and with good eye contact.   Suicidality & violence: None, denied     Therapeutic Interventions:  -Guided patient through identifying goals for upcoming course of therapy.     -Supported patient in addressing relationship issues in midst of change and conflict.     -Explored with patient intersection of mental and physical health with self-care routine.      PSYCHIATRIC RISK ASSESSMENT  Violence Risk Factors:  male, current psychiatric illness, access to weapons,  history or weapons training, and stress/destabilizers  Acute Risk of Harm to Others is Considered: Low  Suicide Risk Factors: male, ; /[place] native, history of trauma or abuse, and decreased self-esteem  Protective Factors: strong coping skills, fear of suicide or death, fear of social disapproval, sense of responsibility towards family, social support/connectedness, moral objections to suicide, motivation to avoid legal consequences, positive family relationships, hopefulness/future-orientation, marriage/partnership, employment, frustration tolerance, and life satisfaction  Acute Risk of Harm to Self is Considered: Low    Assessment:  Roddy Ortiz is a 24-year-old male with past psychiatric history of depression and anxiety I initially saw for medication management who decided against medication at this time and is now seeking therapy for emotional regulation, self-esteem, and relationship issues. There is no history of psychiatric hospitalization, suicidal behaviors, psychosis, amy, or substance abuse. I am seeing him for a limited course of therapy before he re-locates for  work at the end of June.     I encouraged him to integrate feelings from before and after the breakup and we focused on healthy self-care and coping strategies for his related distress.     Diagnosis:  Adjustment Disorder NOS    Plan:  This plan was developed collaboratively with Roddy Ortiz. Discussed treatment progress and plans to continue therapy. He agreed to continue with treatment plan as noted below.      - Assist in understanding emotional, cognitive, and behavioral components of anxiety/depression/anger; develop skills to manage these symptoms  - Will learn to identify anxiety producing thoughts and self statements and develop and use appropriate counter statements  - Assist in identifying and verbalizing feelings to clarify them and increase interpersonal awareness as to causes; use insight gained to assist in resolving negative emotions in a more effective manner  - Develop a plan with patient to include activities that will increase their satisfaction, fulfill their values, and improve quality of life. Evaluate effectiveness of plan; Introduce goal setting and problem solving skills  - Assist in increasing awareness of link between emotions, thoughts and behaviors; identify irrational beliefs and cognitive distortions; learn to challenge irrational beliefs and cognitive distortions and replace them with more realistic self statements (CBT)  - Assist in developing an understanding of healthy life- style skills (i.e. nutrition, exercise, sleep, decreasing caffeine, etc.). Develop plan to increase healthy activities. Provide positive reinforcement for taking action steps toward a healthier life-style.    Case discussed with Dr. Workman

## 2025-06-27 ENCOUNTER — DOCUMENTATION (OUTPATIENT)
Dept: BEHAVIORAL HEALTH | Facility: CLINIC | Age: 25
End: 2025-06-27
Payer: OTHER GOVERNMENT

## 2025-06-27 NOTE — PROGRESS NOTES
Start Time: 4:00pm  End Time: 4:50pm   Date of Service:  6/16/25    Content of therapy:   We discussed termination of therapy and his transition to living with his parents before his new [employer] position. Affirmed his resilience as well as ongoing self-care strategies.     Mental Status  Mood: constricted   Affect:  Stable, midrange   Behavior:   Cooperative, conversant, engaged, and with good eye contact.   Suicidality & violence: None, denied     Therapeutic Interventions:  -Guided patient through identifying goals for upcoming course of therapy.     -Supported patient in addressing relationship issues in midst of change and conflict.     -Explored with patient intersection of mental and physical health with self-care routine.      PSYCHIATRIC RISK ASSESSMENT  Violence Risk Factors:  male, current psychiatric illness, access to weapons,  history or weapons training, and stress/destabilizers  Acute Risk of Harm to Others is Considered: Low  Suicide Risk Factors: male, ; /[place] native, history of trauma or abuse, and decreased self-esteem  Protective Factors: strong coping skills, fear of suicide or death, fear of social disapproval, sense of responsibility towards family, social support/connectedness, moral objections to suicide, motivation to avoid legal consequences, positive family relationships, hopefulness/future-orientation, marriage/partnership, employment, frustration tolerance, and life satisfaction  Acute Risk of Harm to Self is Considered: Low    Assessment:  Roddy Ortiz is a 24-year-old male with past psychiatric history of depression and anxiety I initially saw for medication management who decided against medication at this time and is now seeking therapy for emotional regulation, self-esteem, and relationship issues. There is no history of psychiatric hospitalization, suicidal behaviors, psychosis, amy, or substance abuse. I am seeing him for a limited  course of therapy before he re-locates for work mid-June.     We did motivational interviewing to address the negative feelings he gets seeing his girlfriend on social media. He is committing to reduce contact with his ex-girlfriend at her request and thinks this is best for his mental health as well.     Diagnosis:  Adjustment Disorder NOS    Plan:  This plan was developed collaboratively with Roddy Ortiz.     We terminated treatment today as planned. [place] of [place].     - Assist in understanding emotional, cognitive, and behavioral components of anxiety/depression/anger; develop skills to manage these symptoms  - Will learn to identify anxiety producing thoughts and self statements and develop and use appropriate counter statements  - Assist in identifying and verbalizing feelings to clarify them and increase interpersonal awareness as to causes; use insight gained to assist in resolving negative emotions in a more effective manner  - Develop a plan with patient to include activities that will increase their satisfaction, fulfill their values, and improve quality of life. Evaluate effectiveness of plan; Introduce goal setting and problem solving skills  - Assist in increasing awareness of link between emotions, thoughts and behaviors; identify irrational beliefs and cognitive distortions; learn to challenge irrational beliefs and cognitive distortions and replace them with more realistic self statements (CBT)  - Assist in developing an understanding of healthy life- style skills (i.e. nutrition, exercise, sleep, decreasing caffeine, etc.). Develop plan to increase healthy activities. Provide positive reinforcement for taking action steps toward a healthier life-style.    Case discussed with Dr. Workman